# Patient Record
Sex: FEMALE | Race: ASIAN | NOT HISPANIC OR LATINO
[De-identification: names, ages, dates, MRNs, and addresses within clinical notes are randomized per-mention and may not be internally consistent; named-entity substitution may affect disease eponyms.]

---

## 2018-06-15 PROBLEM — Z00.00 ENCOUNTER FOR PREVENTIVE HEALTH EXAMINATION: Status: ACTIVE | Noted: 2018-06-15

## 2020-01-28 ENCOUNTER — APPOINTMENT (OUTPATIENT)
Dept: RADIATION ONCOLOGY | Facility: CLINIC | Age: 55
End: 2020-01-28

## 2020-02-04 ENCOUNTER — APPOINTMENT (OUTPATIENT)
Dept: RADIATION ONCOLOGY | Facility: CLINIC | Age: 55
End: 2020-02-04
Payer: MEDICAID

## 2020-02-04 VITALS
WEIGHT: 95.46 LBS | DIASTOLIC BLOOD PRESSURE: 63 MMHG | HEIGHT: 64 IN | SYSTOLIC BLOOD PRESSURE: 123 MMHG | TEMPERATURE: 98.3 F | OXYGEN SATURATION: 95 % | BODY MASS INDEX: 16.3 KG/M2 | HEART RATE: 63 BPM

## 2020-02-04 DIAGNOSIS — Z78.9 OTHER SPECIFIED HEALTH STATUS: ICD-10-CM

## 2020-02-04 DIAGNOSIS — C50.919 MALIGNANT NEOPLASM OF UNSPECIFIED SITE OF UNSPECIFIED FEMALE BREAST: ICD-10-CM

## 2020-02-04 DIAGNOSIS — C79.51 MALIGNANT NEOPLASM OF UNSPECIFIED SITE OF UNSPECIFIED FEMALE BREAST: ICD-10-CM

## 2020-02-04 PROCEDURE — 99205 OFFICE O/P NEW HI 60 MIN: CPT | Mod: GC,25

## 2020-02-04 RX ORDER — ALPELISIB 150 MG/1
2 X 150 TABLET ORAL
Refills: 0 | Status: ACTIVE | COMMUNITY
Start: 2020-02-04

## 2020-02-04 RX ORDER — MECLIZINE HYDROCHLORIDE 25 MG/1
25 TABLET ORAL
Refills: 0 | Status: ACTIVE | COMMUNITY
Start: 2020-02-04

## 2020-02-04 RX ORDER — FUROSEMIDE 20 MG/1
20 TABLET ORAL
Refills: 0 | Status: ACTIVE | COMMUNITY
Start: 2020-02-04

## 2020-02-04 RX ORDER — OXYCODONE 10 MG/1
10 TABLET ORAL
Refills: 0 | Status: ACTIVE | COMMUNITY
Start: 2020-02-04

## 2020-02-04 NOTE — VITALS
[Maximal Pain Intensity: 6/10] : 6/10 [Least Pain Intensity: 0/10] : 0/10 [80: Normal activity with effort; some signs or symptoms of disease.] : 80: Normal activity with effort; some signs or symptoms of disease.  [0 - No Distress] : Distress Level: 0 [Date: ____________] : Patient's last distress assessment performed on [unfilled].

## 2020-02-05 ENCOUNTER — OUTPATIENT (OUTPATIENT)
Dept: OUTPATIENT SERVICES | Facility: HOSPITAL | Age: 55
LOS: 1 days | Discharge: HOME | End: 2020-02-05
Payer: MEDICAID

## 2020-02-05 NOTE — DATA REVIEWED
[FreeTextEntry1] : I have personally reviewed the most recent MRI using the [facility]iology portal\par

## 2020-02-05 NOTE — LETTER CLOSING
[FreeTextEntry3] : Estevan Cho MD\par Attending Physician\par Department of Radiation Medicine\par \par \par

## 2020-02-05 NOTE — REVIEW OF SYSTEMS
[Dizziness] : dizziness [Negative] : Genitourinary [Fever] : no fever [Chills] : no chills [Dysphagia] : no dysphagia [Loss of Hearing] : no loss of hearing [Chest Pain] : no chest pain [Palpitations] : no palpitations [Shortness Of Breath] : no shortness of breath [Cough] : no cough [Abdominal Pain] : no abdominal pain [Vomiting] : no vomiting [Constipation] : no constipation [Diarrhea] : no diarrhea [Confused] : no confusion [Fainting] : no fainting [Difficulty Walking] : no difficulty walking [FreeTextEntry3] : left eye vision blurry [Suicidal] : not suicidal [FreeTextEntry9] : patient with back and pelvic pain, on oxycodone for this. bilateral leg weakness [de-identified] : denies headaches. Notes numbness to bilateral legs below the knee and bilateral arms for the past 7 years. Notes frequent dizziness, worse recently, started within the last year. trouble walking within the last year due to bilateral lower extremity weakness, worse in the last year

## 2020-02-05 NOTE — HISTORY OF PRESENT ILLNESS
[FreeTextEntry1] : Ms. 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 ER+, [place]+, HER-2 negative. She underwent right sided mastectomy and lymph node dissection in 5/2012. Path showed 3.2 X 3 X 2.5 cm tumor, 1/25 positive lymph nodes.  Her post-operative course was complicated by lymphedema.  She received adjuvant chemotherapy and radiation therapy including:   Dose dense ACT started 6/2012-10/2012; Tamoxifen started 11/2012. RT completed 1/22/13. \par \par In 1/2018 a PET scan showed metastatic disease to the bone, liver, and pelvic nodes. Fulvestrant, palbociclib started 2/1/18. Thoracic and lumbar MRI 2/7/18 showed osseous mets\par \par 4/7/118 MRI brain showed scattered osseous mets with no intracranial disease.  A lumbar spinal MRI around this time showed worsening osseous mets.   Abemaciclib and fulvestrant started in 6/2018.  She started alpesilib in 8/2019. \par \par A PET scan 1/13/20 showed improved osseous mets, 2 sites of active hepatic metastatic disease, new thyroid activity, and bilateral cervical active nodes. A 1/23/20 brain MRI showed a 2mm and 4mm focus of metastasis consistent with metastasis.  MRI was ordered for generalized symptoms including dizziness.  Osseous metastatic disease was also demonstrated.\par \par Today she feels well. Denies headaches. Denies focal weakness. Has dizziness which has been worse over the last year. Left sided vision has been worse recently. Notes frequent back and leg pain, for which she has been taking oxycodone 30mg as needed a few times per day over the past 7 years. She has had numbness to her bilateral legs below the knee and bilateral arms for the past 7 years. \par

## 2020-02-12 ENCOUNTER — OUTPATIENT (OUTPATIENT)
Dept: OUTPATIENT SERVICES | Facility: HOSPITAL | Age: 55
LOS: 1 days | End: 2020-02-12
Payer: MEDICAID

## 2020-02-12 ENCOUNTER — APPOINTMENT (OUTPATIENT)
Dept: MRI IMAGING | Facility: IMAGING CENTER | Age: 55
End: 2020-02-12

## 2020-02-12 ENCOUNTER — APPOINTMENT (OUTPATIENT)
Dept: NEUROSURGERY | Facility: CLINIC | Age: 55
End: 2020-02-12
Payer: MEDICAID

## 2020-02-12 DIAGNOSIS — C50.919 MALIGNANT NEOPLASM OF UNSPECIFIED SITE OF UNSPECIFIED FEMALE BREAST: ICD-10-CM

## 2020-02-12 DIAGNOSIS — C79.31 SECONDARY MALIGNANT NEOPLASM OF BRAIN: ICD-10-CM

## 2020-02-12 PROCEDURE — 61796 SRS CRANIAL LESION SIMPLE: CPT

## 2020-02-12 PROCEDURE — 76498 UNLISTED MR PROCEDURE: CPT

## 2020-02-12 PROCEDURE — A9585: CPT

## 2020-02-12 PROCEDURE — 77334 RADIATION TREATMENT AID(S): CPT | Mod: 26

## 2020-02-12 PROCEDURE — 77300 RADIATION THERAPY DOSE PLAN: CPT | Mod: 26

## 2020-02-12 PROCEDURE — 77263 THER RADIOLOGY TX PLNG CPLX: CPT

## 2020-02-12 PROCEDURE — 77432 STEREOTACTIC RADIATION TRMT: CPT

## 2020-02-12 PROCEDURE — 77295 3-D RADIOTHERAPY PLAN: CPT | Mod: 26

## 2020-02-20 DIAGNOSIS — C50.919 MALIGNANT NEOPLASM OF UNSPECIFIED SITE OF UNSPECIFIED FEMALE BREAST: ICD-10-CM

## 2020-04-22 ENCOUNTER — TRANSCRIPTION ENCOUNTER (OUTPATIENT)
Age: 55
End: 2020-04-22

## 2020-04-22 ENCOUNTER — APPOINTMENT (OUTPATIENT)
Dept: NEUROSURGERY | Facility: CLINIC | Age: 55
End: 2020-04-22

## 2020-04-22 ENCOUNTER — OUTPATIENT (OUTPATIENT)
Dept: OUTPATIENT SERVICES | Facility: HOSPITAL | Age: 55
LOS: 1 days | End: 2020-04-22
Payer: MEDICAID

## 2020-04-22 ENCOUNTER — RESULT REVIEW (OUTPATIENT)
Age: 55
End: 2020-04-22

## 2020-04-22 ENCOUNTER — APPOINTMENT (OUTPATIENT)
Dept: MRI IMAGING | Facility: IMAGING CENTER | Age: 55
End: 2020-04-22
Payer: MEDICAID

## 2020-04-22 ENCOUNTER — APPOINTMENT (OUTPATIENT)
Dept: RADIATION ONCOLOGY | Facility: CLINIC | Age: 55
End: 2020-04-22

## 2020-04-22 DIAGNOSIS — C50.919 MALIGNANT NEOPLASM OF UNSPECIFIED SITE OF UNSPECIFIED FEMALE BREAST: ICD-10-CM

## 2020-04-22 PROCEDURE — A9585: CPT

## 2020-04-22 PROCEDURE — 70553 MRI BRAIN STEM W/O & W/DYE: CPT

## 2020-04-22 PROCEDURE — 70553 MRI BRAIN STEM W/O & W/DYE: CPT | Mod: 26

## 2020-04-23 ENCOUNTER — APPOINTMENT (OUTPATIENT)
Dept: RADIATION ONCOLOGY | Facility: CLINIC | Age: 55
End: 2020-04-23
Payer: MEDICAID

## 2020-04-23 PROCEDURE — 99024 POSTOP FOLLOW-UP VISIT: CPT

## 2020-04-24 NOTE — REVIEW OF SYSTEMS
[Fever] : no fever [Night Sweats] : no night sweats [Chills] : no chills [Loss of Hearing] : no loss of hearing [Dysphagia] : no dysphagia [Chest Pain] : no chest pain [Shortness Of Breath] : no shortness of breath [Palpitations] : no palpitations [Abdominal Pain] : no abdominal pain [Cough] : no cough [Constipation] : no constipation [Vomiting] : no vomiting [Confused] : no confusion [Diarrhea] : no diarrhea [Fainting] : no fainting [Difficulty Walking] : no difficulty walking [FreeTextEntry3] : left eye vision blurry, stable over the last few months [Suicidal] : not suicidal [de-identified] : denies headaches. Notes numbness to bilateral legs below the knee and bilateral arms for the past 7 years. Slight intermittent dizziness [FreeTextEntry9] : patient with back and arm pain on oxycodone for this. bilateral leg weakness

## 2020-04-24 NOTE — DATA REVIEWED
[FreeTextEntry1] : I have personally reviewed the most recent MRI using the [facility] portal\par

## 2020-04-24 NOTE — HISTORY OF PRESENT ILLNESS
[Home] : at home, [unfilled] , at the time of the visit. [Medical Office: ([facility])___] : at the medical office located in  [Family Member] : family member [Patient] : the patient [FreeTextEntry1] : Ms. Velez completed gamma knife radiation for a total of 2000 cgy on 2/12/2020 to a right cerebellar lesion. \par \par ONCOLOGY HISTORY\par Ms. 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 ER+, [place]+, HER-2 negative. She underwent right sided mastectomy and lymph node dissection in 5/2012. Path showed 3.2 X 3 X 2.5 cm tumor, 1/25 positive lymph nodes.  Her post-operative course was complicated by lymphedema.  She received adjuvant chemotherapy and radiation therapy including:   Dose dense ACT started 6/2012-10/2012; Tamoxifen started 11/2012. RT completed 1/22/13. \par \par In 1/2018 a PET scan showed metastatic disease to the bone, liver, and pelvic nodes. Fulvestrant, palbociclib started 2/1/18. Thoracic and lumbar MRI 2/7/18 showed osseous mets\par \par 4/7/118 MRI brain showed scattered osseous mets with no intracranial disease.  A lumbar spinal MRI around this time showed worsening osseous mets.   Abemaciclib and fulvestrant started in 6/2018.  She started alpesilib in 8/2019. \par \par A PET scan 1/13/20 showed improved osseous mets, 2 sites of active hepatic metastatic disease, new thyroid activity, and bilateral cervical active nodes. A 1/23/20 brain MRI showed a 2mm and 4mm focus of metastasis consistent with metastasis.  MRI was ordered for generalized symptoms including dizziness.  Osseous metastatic disease was also demonstrated.\par \par At the time of initial consult on 2/4/2020 she feels well. Denies headaches. Denies focal weakness. Has dizziness which has been worse over the last year. Left sided vision has been worse recently. Notes frequent back and leg pain, for which she has been taking oxycodone 30mg as needed a few times per day over the past 7 years. She has had numbness to her bilateral legs below the knee and bilateral arms for the past 7 years. \par \par 4/22/2020- ms. Velez presents today for follow up via TELEPHONE appointment. She provides consent for this on 4/23/2020 at 12:14 PM. [employer]  [id number] used for interview today. MRI done 4/22/2020 showed right lateral cerebellar metastasis has increased in size now measuring 0.7 x 0.5 cm. Left frontal lobe metastasis measuring 0.5 x 0.3 cm is also more conspicuous than on prior imaging. \par New metastatic lesions measuring 0.3 x 0.2 cm in the left cerebellar hemisphere and 0.2 cm in the right cerebellar hemisphere. \par Today she continues on piqray, and was started on letrozole recently after COVID 19. \par Taking aranesp for anemia, last dose 4/17. Still feeling generally weak. Has intermittent dizziness, and generalized weakness, particularly her thighs and upper arms.

## 2020-04-30 ENCOUNTER — APPOINTMENT (OUTPATIENT)
Dept: NEUROSURGERY | Facility: AMBULATORY SURGERY CENTER | Age: 55
End: 2020-04-30

## 2020-04-30 PROCEDURE — 77432 STEREOTACTIC RADIATION TRMT: CPT

## 2020-04-30 PROCEDURE — 77300 RADIATION THERAPY DOSE PLAN: CPT | Mod: 26

## 2020-04-30 PROCEDURE — 77295 3-D RADIOTHERAPY PLAN: CPT | Mod: 26

## 2020-04-30 PROCEDURE — 77334 RADIATION TREATMENT AID(S): CPT | Mod: 26

## 2020-04-30 PROCEDURE — 77263 THER RADIOLOGY TX PLNG CPLX: CPT

## 2020-07-02 ENCOUNTER — RESULT REVIEW (OUTPATIENT)
Age: 55
End: 2020-07-02

## 2020-07-02 ENCOUNTER — OUTPATIENT (OUTPATIENT)
Dept: OUTPATIENT SERVICES | Facility: HOSPITAL | Age: 55
LOS: 1 days | End: 2020-07-02
Payer: MEDICAID

## 2020-07-02 ENCOUNTER — APPOINTMENT (OUTPATIENT)
Dept: MRI IMAGING | Facility: IMAGING CENTER | Age: 55
End: 2020-07-02
Payer: MEDICAID

## 2020-07-02 DIAGNOSIS — C50.919 MALIGNANT NEOPLASM OF UNSPECIFIED SITE OF UNSPECIFIED FEMALE BREAST: ICD-10-CM

## 2020-07-02 DIAGNOSIS — Z00.8 ENCOUNTER FOR OTHER GENERAL EXAMINATION: ICD-10-CM

## 2020-07-02 PROCEDURE — A9585: CPT

## 2020-07-02 PROCEDURE — 70553 MRI BRAIN STEM W/O & W/DYE: CPT

## 2020-07-02 PROCEDURE — 70553 MRI BRAIN STEM W/O & W/DYE: CPT | Mod: 26

## 2020-07-08 ENCOUNTER — APPOINTMENT (OUTPATIENT)
Dept: MRI IMAGING | Facility: IMAGING CENTER | Age: 55
End: 2020-07-08

## 2020-07-08 ENCOUNTER — APPOINTMENT (OUTPATIENT)
Dept: RADIATION ONCOLOGY | Facility: CLINIC | Age: 55
End: 2020-07-08
Payer: MEDICAID

## 2020-07-08 ENCOUNTER — APPOINTMENT (OUTPATIENT)
Dept: NEUROSURGERY | Facility: CLINIC | Age: 55
End: 2020-07-08
Payer: MEDICAID

## 2020-07-08 VITALS
OXYGEN SATURATION: 100 % | HEART RATE: 62 BPM | WEIGHT: 101.19 LBS | SYSTOLIC BLOOD PRESSURE: 166 MMHG | DIASTOLIC BLOOD PRESSURE: 96 MMHG | RESPIRATION RATE: 18 BRPM | BODY MASS INDEX: 17.37 KG/M2

## 2020-07-08 DIAGNOSIS — C79.31 MALIGNANT NEOPLASM OF UNSPECIFIED SITE OF UNSPECIFIED FEMALE BREAST: ICD-10-CM

## 2020-07-08 DIAGNOSIS — C50.919 MALIGNANT NEOPLASM OF UNSPECIFIED SITE OF UNSPECIFIED FEMALE BREAST: ICD-10-CM

## 2020-07-08 PROCEDURE — 99214 OFFICE O/P EST MOD 30 MIN: CPT

## 2020-07-08 PROCEDURE — 99213 OFFICE O/P EST LOW 20 MIN: CPT | Mod: 24

## 2020-07-08 RX ORDER — FENTANYL 50 UG/H
50 PATCH, EXTENDED RELEASE TRANSDERMAL
Refills: 0 | Status: ACTIVE | COMMUNITY
Start: 2020-07-08

## 2020-07-08 RX ORDER — FENTANYL 100 UG/H
100 PATCH, EXTENDED RELEASE TRANSDERMAL
Refills: 0 | Status: ACTIVE | COMMUNITY
Start: 2020-07-08

## 2020-07-08 RX ORDER — LETROZOLE TABLETS 2.5 MG/1
2.5 TABLET, FILM COATED ORAL
Refills: 0 | Status: DISCONTINUED | COMMUNITY
Start: 2020-04-23 | End: 2020-07-08

## 2020-07-08 NOTE — REASON FOR VISIT
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 ER+, MI+, HER-2 negative. She underwent right sided mastectomy and lymph node dissection in 5/2012. She received adjuvant chemotherapy and radiation therapy including: Dose dense ACT started 6/2012-10/2012; Tamoxifen started 11/2012. RT completed 1/22/13. \par s/p SRS asymptomatic right cerebella met 02/12/2020.\par \par Patient comes today to discuss her f/u MRI results, which showed three new cerebral mets. Overall she feels stable, no HA, no vomiting. She complains of fatigue and dysethesia in her extremitites. Also she complains of right leg pain.

## 2020-07-08 NOTE — PHYSICAL EXAM
[Person] : oriented to person [Place] : oriented to place [Time] : oriented to time [Short Term Intact] : short term memory intact [Remote Intact] : remote memory intact [Span Intact] : the attention span was normal [Concentration Intact] : normal concentrating ability [Fluency] : fluency intact [Comprehension] : comprehension intact [Current Events] : adequate knowledge of current events [Past History] : adequate knowledge of personal past history [Vocabulary] : adequate range of vocabulary [Cranial Nerves Optic (II)] : visual acuity intact bilaterally,  pupils equal round and reactive to light [Cranial Nerves Oculomotor (III)] : extraocular motion intact [Cranial Nerves Trigeminal (V)] : facial sensation intact symmetrically [Cranial Nerves Facial (VII)] : face symmetrical [Cranial Nerves Vestibulocochlear (VIII)] : hearing was intact bilaterally [Cranial Nerves Glossopharyngeal (IX)] : tongue and palate midline [Cranial Nerves Accessory (XI - Cranial And Spinal)] : head turning and shoulder shrug symmetric [Motor Tone] : muscle tone was normal in all four extremities [Cranial Nerves Hypoglossal (XII)] : there was no tongue deviation with protrusion [No Muscle Atrophy] : normal bulk in all four extremities [Sensation Tactile Decrease] : light touch was intact [Motor Strength] : muscle strength was normal in all four extremities [Balance] : balance was intact [Abnormal Walk] : normal gait [Tremor] : no tremor present [Past-pointing] : there was no past-pointing [2+] : Patella left 2+

## 2020-07-08 NOTE — VITALS
[Date: ____________] : Patient's last distress assessment performed on [unfilled]. [80: Normal activity with effort; some signs or symptoms of disease.] : 80: Normal activity with effort; some signs or symptoms of disease.  [0 - No Distress] : Distress Level: 0 [Maximal Pain Intensity: 5/10] : 5/10 [Least Pain Intensity: 2/10] : 2/10

## 2020-07-09 PROBLEM — C50.919 BREAST CANCER METASTASIZED TO BRAIN: Status: ACTIVE | Noted: 2020-02-04

## 2020-07-09 NOTE — DATA REVIEWED
[FreeTextEntry1] : I have personally reviewed the most recent MRI using the [facility]iology portal\par

## 2020-07-09 NOTE — REVIEW OF SYSTEMS
[Dizziness] : dizziness [Negative] : Endocrine [Dysphagia] : dysphagia [Fever] : no fever [Night Sweats] : no night sweats [Chills] : no chills [Loss of Hearing] : no loss of hearing [Chest Pain] : no chest pain [Palpitations] : no palpitations [Shortness Of Breath] : no shortness of breath [Cough] : no cough [Abdominal Pain] : no abdominal pain [Constipation] : no constipation [Vomiting] : no vomiting [Diarrhea] : no diarrhea [Confused] : no confusion [Suicidal] : not suicidal [Fainting] : no fainting [Difficulty Walking] : no difficulty walking [FreeTextEntry3] : left eye vision blurry, stable over the last few months [FreeTextEntry9] : patient with back and pelvis pain, on fentanyl 250 mcg and bilateral leg weakness. right leg very swollen, with pain worse over the past couple of days [FreeTextEntry4] : slightly more trouble swallowing recenltyu [de-identified] : denies headaches. Notes numbness to bilateral legs below the knee and bilateral arms for the past 7 years.

## 2020-07-09 NOTE — HISTORY OF PRESENT ILLNESS
[Home] : at home, [unfilled] , at the time of the visit. [Medical Office: ([facility])___] : at the medical office located in  [Family Member] : family member [Patient] : the patient [FreeTextEntry1] : Ms. Velez completed gamma knife radiation for a total of 2000 cgy on 2/12/2020 to a right cerebellar lesion. \par \par ONCOLOGY HISTORY\par Ms. 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 ER+, [place]+, HER-2 negative. She underwent right sided mastectomy and lymph node dissection in 5/2012. Path showed 3.2 X 3 X 2.5 cm tumor, 1/25 positive lymph nodes.  Her post-operative course was complicated by lymphedema.  She received adjuvant chemotherapy and radiation therapy including:   Dose dense ACT started 6/2012-10/2012; Tamoxifen started 11/2012. RT completed 1/22/13. \par \par In 1/2018 a PET scan showed metastatic disease to the bone, liver, and pelvic nodes. Fulvestrant, palbociclib started 2/1/18. Thoracic and lumbar MRI 2/7/18 showed osseous mets\par \par 4/7/118 MRI brain showed scattered osseous mets with no intracranial disease.  A lumbar spinal MRI around this time showed worsening osseous mets.   Abemaciclib and fulvestrant started in 6/2018.  She started alpesilib in 8/2019. \par \par A PET scan 1/13/20 showed improved osseous mets, 2 sites of active hepatic metastatic disease, new thyroid activity, and bilateral cervical active nodes. A 1/23/20 brain MRI showed a 2mm and 4mm focus of metastasis consistent with metastasis.  MRI was ordered for generalized symptoms including dizziness.  Osseous metastatic disease was also demonstrated.\par \par At the time of initial consult on 2/4/2020 she feels well. Denies headaches. Denies focal weakness. Has dizziness which has been worse over the last year. Left sided vision has been worse recently. Notes frequent back and leg pain, for which she has been taking oxycodone 30mg as needed a few times per day over the past 7 years. She has had numbness to her bilateral legs below the knee and bilateral arms for the past 7 years. \par \par 4/22/2020- ms. Velez presents today for follow up via TELEPHONE appointment. She provides consent for this on 4/23/2020 at 12:14 PM. [employer]  [id number] used for interview today. MRI done 4/22/2020 showed right lateral cerebellar metastasis has increased in size now measuring 0.7 x 0.5 cm. Left frontal lobe metastasis measuring 0.5 x 0.3 cm is also more conspicuous than on prior imaging. \par New metastatic lesions measuring 0.3 x 0.2 cm in the left cerebellar hemisphere and 0.2 cm in the right cerebellar hemisphere. \par Today she continues on piqray, and was started on letrozole recently after COVID 19. \par Taking aranesp for anemia, last dose 4/17. Still feeling generally weak. Has intermittent dizziness, and generalized weakness, particularly her thighs and upper arms. \par \par 7/8/2020- Ms. Velez presents today for follow up. [employer]  [id number] used for interview today. MRI brain 7/2/2020 showed Redemonstration of multiple subcentimeter metastatic parenchymal lesions and foci of leptomeningeal enhancement. Some of the lesions have decreased in size, others have increased in size. \par Interval appearance of multiple leptomeningeal foci of enhancement in the cerebellar hemispheres. \par \par Today she is very bothered by right sided leg swelling and pain, worse over the past few days. Denies headaches, nausea, vomiting, blurry vision.Notes swallowing has gotten worse lately. Has some dizziness, which is not terrible but did cause her to sit down today when going about normal activities.\par \par On fentanly 250 mcg daily for back and pelvis pain\par \par

## 2020-07-09 NOTE — PHYSICAL EXAM
[General Appearance - Well Developed] : well developed [No Focal Deficits] : no focal deficits [Normal] : no focal deficits [de-identified] : right leg swollen 4+ edema. Very weak, patietn with difficulty ambulating

## 2020-08-08 ENCOUNTER — APPOINTMENT (OUTPATIENT)
Dept: MRI IMAGING | Facility: IMAGING CENTER | Age: 55
End: 2020-08-08